# Patient Record
Sex: FEMALE | Race: ASIAN | NOT HISPANIC OR LATINO | URBAN - METROPOLITAN AREA
[De-identification: names, ages, dates, MRNs, and addresses within clinical notes are randomized per-mention and may not be internally consistent; named-entity substitution may affect disease eponyms.]

---

## 2024-10-28 ENCOUNTER — LAB OUTSIDE AN ENCOUNTER (OUTPATIENT)
Dept: URBAN - METROPOLITAN AREA CLINIC 33 | Facility: CLINIC | Age: 39
End: 2024-10-28

## 2024-10-28 ENCOUNTER — OFFICE VISIT (OUTPATIENT)
Dept: URBAN - METROPOLITAN AREA CLINIC 33 | Facility: CLINIC | Age: 39
End: 2024-10-28
Payer: COMMERCIAL

## 2024-10-28 ENCOUNTER — DASHBOARD ENCOUNTERS (OUTPATIENT)
Age: 39
End: 2024-10-28

## 2024-10-28 VITALS
WEIGHT: 173 LBS | HEART RATE: 72 BPM | DIASTOLIC BLOOD PRESSURE: 74 MMHG | SYSTOLIC BLOOD PRESSURE: 122 MMHG | HEIGHT: 64 IN | OXYGEN SATURATION: 97 % | BODY MASS INDEX: 29.53 KG/M2

## 2024-10-28 DIAGNOSIS — K76.0 FATTY LIVER: ICD-10-CM

## 2024-10-28 DIAGNOSIS — K21.9 GASTROESOPHAGEAL REFLUX DISEASE, UNSPECIFIED WHETHER ESOPHAGITIS PRESENT: ICD-10-CM

## 2024-10-28 DIAGNOSIS — R10.13 EPIGASTRIC PAIN: ICD-10-CM

## 2024-10-28 PROBLEM — 197321007: Status: ACTIVE | Noted: 2024-10-28

## 2024-10-28 PROBLEM — 79922009: Status: ACTIVE | Noted: 2024-10-28

## 2024-10-28 PROBLEM — 235595009: Status: ACTIVE | Noted: 2024-10-28

## 2024-10-28 PROCEDURE — 99204 OFFICE O/P NEW MOD 45 MIN: CPT | Performed by: INTERNAL MEDICINE

## 2024-10-28 RX ORDER — LEVOTHYROXINE SODIUM 50 UG/1
1 TABLET IN THE MORNING ON AN EMPTY STOMACH TABLET ORAL
Status: ACTIVE | COMMUNITY

## 2024-10-28 RX ORDER — SUCRALFATE 1 G/1
TAKE ONE TABLET BY MOUTH TWO TIMES A DAY ON AN EMPTY STOMACH TABLET ORAL
Refills: 0 | Status: ACTIVE | COMMUNITY

## 2024-10-28 RX ORDER — ESOMEPRAZOLE MAGNESIUM 40 MG/1
TAKE ONE CAPSULE BY MOUTH TWICE A DAY CAPSULE, DELAYED RELEASE ORAL
Qty: 60 UNSPECIFIED | Refills: 0 | Status: ACTIVE | COMMUNITY

## 2024-10-28 NOTE — HPI-FECAL URGENCY
Patient complains of fecal urgency. States that after meals she feels immediate urge to pass stool. Admits to 3-4 bowel movements a day with normal stools. Denies rectal bleeding, mucus, or melena. Denies associated weight loss.

## 2024-10-28 NOTE — HPI-ABDOMINAL PAIN
39 year old female patient presents for abdominal pain consult. Admits she has been experiencing symptoms since September 17, 2024. Describes symptoms as constant ache in epigastric region that radiates to RUQ. She admits symptoms are more present after meals. Denies nausea or vomiting. Denies EGD in the past.  Admits having recent imaging. States she went to [facility] on 09/26/2024 for symptoms. She had XR and US RUQ completed and labs.   US RUQ ABDOMEN (09/26/2024) IMPRESSION:  No evidence of cholelithiasis or cholecystitis. Fatty liver.  CHEST XR (09/26/2024) IMPRESSION: No acute radiographic abnormality.  OUTSIDE LABS (09/26/2024)  CBC - WNL  CMP - WNL  OUTSIDE LABS (08/12/2024) CBC w/ DIFF/PLT Eos, absolute - 0.6 H All other values WNL  CMP  Alk Phos - 133 H All other values WNL  Lipid Panel - WNL Vitamin B12 - WNL A1c - WNL  TSH - 7.010 H  Sed Rate - WNL Creatine Kinase - WNL  TIFFANIE by IFA - neg

## 2024-10-28 NOTE — HPI-BLOATING/GAS
39 year old patient complains of bloating. States symptoms have been present since September 17, 2024. Admits symptoms are more present after meals. Denies taking any OTC medications to relieve symptoms. Describes symptoms as fullness in epigastric region. States that upon applying pressure in region, she can induce belching. Admits having any previous breath test including H. Pylori or SIBO (2017).

## 2024-10-31 ENCOUNTER — OFFICE VISIT (OUTPATIENT)
Dept: URBAN - METROPOLITAN AREA CLINIC 23 | Facility: CLINIC | Age: 39
End: 2024-10-31

## 2024-11-14 ENCOUNTER — CLAIMS CREATED FROM THE CLAIM WINDOW (OUTPATIENT)
Dept: URBAN - METROPOLITAN AREA SURGERY CENTER 8 | Facility: SURGERY CENTER | Age: 39
End: 2024-11-14
Payer: COMMERCIAL

## 2024-11-14 ENCOUNTER — CLAIMS CREATED FROM THE CLAIM WINDOW (OUTPATIENT)
Dept: URBAN - METROPOLITAN AREA CLINIC 4 | Facility: CLINIC | Age: 39
End: 2024-11-14
Payer: COMMERCIAL

## 2024-11-14 ENCOUNTER — TELEPHONE ENCOUNTER (OUTPATIENT)
Dept: URBAN - METROPOLITAN AREA CLINIC 33 | Facility: CLINIC | Age: 39
End: 2024-11-14

## 2024-11-14 DIAGNOSIS — K29.80 DUODENITIS WITHOUT BLEEDING: ICD-10-CM

## 2024-11-14 DIAGNOSIS — K29.60 OTHER GASTRITIS WITHOUT BLEEDING: ICD-10-CM

## 2024-11-14 DIAGNOSIS — K29.80 ACUTE DUODENITIS: ICD-10-CM

## 2024-11-14 DIAGNOSIS — K31.89 OTHER DISEASES OF STOMACH AND DUODENUM: ICD-10-CM

## 2024-11-14 DIAGNOSIS — R10.13 ABDOMINAL DISCOMFORT, EPIGASTRIC: ICD-10-CM

## 2024-11-14 PROCEDURE — 88305 TISSUE EXAM BY PATHOLOGIST: CPT | Performed by: PATHOLOGY

## 2024-11-14 PROCEDURE — 88342 IMHCHEM/IMCYTCHM 1ST ANTB: CPT | Performed by: PATHOLOGY

## 2024-11-14 PROCEDURE — 43239 EGD BIOPSY SINGLE/MULTIPLE: CPT | Performed by: INTERNAL MEDICINE

## 2024-11-14 PROCEDURE — 00731 ANES UPR GI NDSC PX NOS: CPT | Performed by: REGISTERED NURSE

## 2024-11-14 RX ORDER — SUCRALFATE 1 G/1
TAKE ONE TABLET BY MOUTH TWO TIMES A DAY ON AN EMPTY STOMACH TABLET ORAL
Refills: 0 | Status: ACTIVE | COMMUNITY

## 2024-11-14 RX ORDER — ESOMEPRAZOLE MAGNESIUM 40 MG/1
TAKE ONE CAPSULE BY MOUTH TWICE A DAY CAPSULE, DELAYED RELEASE ORAL
Qty: 60 UNSPECIFIED | Refills: 0 | Status: ACTIVE | COMMUNITY

## 2024-11-14 RX ORDER — LEVOTHYROXINE SODIUM 50 UG/1
1 TABLET IN THE MORNING ON AN EMPTY STOMACH TABLET ORAL
Status: ACTIVE | COMMUNITY

## 2024-11-25 ENCOUNTER — OFFICE VISIT (OUTPATIENT)
Dept: URBAN - METROPOLITAN AREA CLINIC 33 | Facility: CLINIC | Age: 39
End: 2024-11-25
Payer: COMMERCIAL

## 2024-11-25 VITALS
HEIGHT: 64 IN | HEART RATE: 71 BPM | WEIGHT: 171 LBS | DIASTOLIC BLOOD PRESSURE: 74 MMHG | SYSTOLIC BLOOD PRESSURE: 114 MMHG | OXYGEN SATURATION: 97 % | BODY MASS INDEX: 29.19 KG/M2

## 2024-11-25 DIAGNOSIS — K29.50 CHRONIC GASTRITIS WITHOUT BLEEDING, UNSPECIFIED GASTRITIS TYPE: ICD-10-CM

## 2024-11-25 DIAGNOSIS — K21.9 GASTROESOPHAGEAL REFLUX DISEASE, UNSPECIFIED WHETHER ESOPHAGITIS PRESENT: ICD-10-CM

## 2024-11-25 DIAGNOSIS — R14.0 ABDOMINAL BLOATING: ICD-10-CM

## 2024-11-25 DIAGNOSIS — K76.0 FATTY LIVER: ICD-10-CM

## 2024-11-25 DIAGNOSIS — K52.9 POSTPRANDIAL DIARRHEA: ICD-10-CM

## 2024-11-25 DIAGNOSIS — R10.13 EPIGASTRIC PAIN: ICD-10-CM

## 2024-11-25 PROBLEM — 14384003: Status: ACTIVE | Noted: 2024-11-25

## 2024-11-25 PROBLEM — 8493009: Status: ACTIVE | Noted: 2024-11-25

## 2024-11-25 PROCEDURE — 99213 OFFICE O/P EST LOW 20 MIN: CPT | Performed by: INTERNAL MEDICINE

## 2024-11-25 RX ORDER — LEVOTHYROXINE SODIUM 50 UG/1
1 TABLET IN THE MORNING ON AN EMPTY STOMACH TABLET ORAL
Status: ACTIVE | COMMUNITY

## 2024-11-25 RX ORDER — ESOMEPRAZOLE MAGNESIUM 40 MG/1
TAKE ONE CAPSULE BY MOUTH TWICE A DAY CAPSULE, DELAYED RELEASE ORAL
Qty: 60 UNSPECIFIED | Refills: 0 | Status: ON HOLD | COMMUNITY

## 2024-11-25 RX ORDER — SUCRALFATE 1 G/1
TAKE ONE TABLET BY MOUTH TWO TIMES A DAY ON AN EMPTY STOMACH TABLET ORAL
Refills: 0 | Status: ON HOLD | COMMUNITY

## 2024-11-25 NOTE — HPI-BLOATING/GAS
39 year old female patient presents for follow up. She admits improvement.   Of last visit (10/28/2024) 39 year old patient complains of bloating. States symptoms have been present since September 17, 2024. Admits symptoms are more present after meals. Denies taking any OTC medications to relieve symptoms. Describes symptoms as fullness in epigastric region. States that upon applying pressure in region, she can induce belching. Admits having any previous breath test including H. Pylori or SIBO (2017).

## 2024-11-25 NOTE — HPI-ABDOMINAL PAIN
39 year old female patient presents for follow up. She admits improvement. She does admit occasional pain while laying down to sleep, but states it is not as painful compared to last visit. She had EGD completed on 11/14/2024, with results noted below. Since the procedure, she denies dysphagia, globus, changes in appetite, and changes in bowel habits.  EGD REPORT IMPRESSION: Normal esophagus. Z-line regular. Erythematous mucosa in the antrum. Biopsied. Normal examined duodenum. Biopsied.  EGD PATH (A) Duodenum, Biopsy (Cold Forceps): PEPTIC DUODENITIS. No Evidence of Infectious Organisms or Sprue. Negative for Dysplasia or Malignancy. (B) Stomach, Antrum;Body, Biopsy (Cold Forceps): CHRONIC INACTIVE GASTRITIS WITH HISTOLOGICAL CHANGES SUGGESTIVE OF TREATED H. PYLORI GASTRITIS. See Comment. No Evidence of H. Pylori Organisms or Intestinal Metaplasia. Negative For Dysplasia or Malignancy. COMMENT: The biopsy shows band-like superficial and interstitial deeper lymphoplasmacytic infiltrate and variable presence of some lymphoid follicles, without acute inflammation. No Helicobacter organisms are identified on H & amp;E and special stains. The histologic findings may represent past H. pylori gastritis treated with antibiotics and/or proton-pump inhibitors. Non-invasive test (such as urea breath test, fecal antigen detection or serum antibody) might be helpful to completely exclude H. pylori infection if clinically indicated   Of last visit (10/28/2024) 39 year old female patient presents for abdominal pain consult. Admits she has been experiencing symptoms since September 17, 2024. Describes symptoms as constant ache in epigastric region that radiates to RUQ. She admits symptoms are more present after meals. Denies nausea or vomiting. Denies EGD in the past.  Admits having recent imaging. States she went to [facility] on 09/26/2024 for symptoms. She had XR and US RUQ completed and labs.   US RUQ ABDOMEN (09/26/2024) IMPRESSION:  No evidence of cholelithiasis or cholecystitis. Fatty liver.  CHEST XR (09/26/2024) IMPRESSION: No acute radiographic abnormality.  OUTSIDE LABS (09/26/2024)  CBC - WNL  CMP - WNL  OUTSIDE LABS (08/12/2024) CBC w/ DIFF/PLT Eos, absolute - 0.6 H All other values WNL  CMP  Alk Phos - 133 H All other values WNL  Lipid Panel - WNL Vitamin B12 - WNL A1c - WNL  TSH - 7.010 H  Sed Rate - WNL Creatine Kinase - WNL  TIFFANIE by IFA - neg

## 2024-11-25 NOTE — HPI-FECAL URGENCY
39 year old female patient presents for follow up. She admits symptoms are about the same. She admits 3-4 bowel movements a day with normal stools. Denies rectal bleeding, mucus, or melena. Denies associated weight loss.   Of last visit (10/28/2024) Patient complains of fecal urgency. States that after meals she feels immediate urge to pass stool. Admits to 3-4 bowel movements a day with normal stools. Denies rectal bleeding, mucus, or melena. Denies associated weight loss.

## 2025-03-21 ENCOUNTER — OFFICE VISIT (OUTPATIENT)
Dept: URBAN - METROPOLITAN AREA CLINIC 33 | Facility: CLINIC | Age: 40
End: 2025-03-21

## 2025-03-21 RX ORDER — SUCRALFATE 1 G/1
TAKE ONE TABLET BY MOUTH TWO TIMES A DAY ON AN EMPTY STOMACH TABLET ORAL
Refills: 0 | Status: ON HOLD | COMMUNITY

## 2025-03-21 RX ORDER — ESOMEPRAZOLE MAGNESIUM 40 MG/1
TAKE ONE CAPSULE BY MOUTH TWICE A DAY CAPSULE, DELAYED RELEASE ORAL
Qty: 60 UNSPECIFIED | Refills: 0 | Status: ON HOLD | COMMUNITY

## 2025-03-21 RX ORDER — LEVOTHYROXINE SODIUM 50 UG/1
1 TABLET IN THE MORNING ON AN EMPTY STOMACH TABLET ORAL
Status: ACTIVE | COMMUNITY

## 2025-03-21 NOTE — HPI-TODAY'S VISIT:
40 year old female patient of Dr. Eli's presents for consult regarding abdominal pain, diarrhea, and fatigue.    OUTSIDE LABS (02/19/2025) HBsAg - negative HCV - nonreactive  PCP LABS (11/15/2024) CMP Bilirubin: 0.4 wnl, Alk Phos: 135 H, AST: 14 wnl, ALT: 13 wnl, Creatinine: 0.97 wnl, Albumin: 4.3 wnl

## 2025-04-03 ENCOUNTER — OFFICE VISIT (OUTPATIENT)
Dept: URBAN - METROPOLITAN AREA CLINIC 33 | Facility: CLINIC | Age: 40
End: 2025-04-03
Payer: COMMERCIAL

## 2025-04-03 DIAGNOSIS — R10.13 EPIGASTRIC ABDOMINAL PAIN: ICD-10-CM

## 2025-04-03 DIAGNOSIS — K58.0 IRRITABLE BOWEL SYNDROME WITH DIARRHEA: ICD-10-CM

## 2025-04-03 PROBLEM — 116289008: Status: ACTIVE | Noted: 2025-04-03

## 2025-04-03 PROBLEM — 197125005: Status: ACTIVE | Noted: 2025-04-03

## 2025-04-03 PROBLEM — 79922009: Status: ACTIVE | Noted: 2025-04-03

## 2025-04-03 PROCEDURE — 99214 OFFICE O/P EST MOD 30 MIN: CPT | Performed by: HOSPITALIST

## 2025-04-03 RX ORDER — ESOMEPRAZOLE MAGNESIUM 40 MG/1
TAKE ONE CAPSULE BY MOUTH TWICE A DAY CAPSULE, DELAYED RELEASE ORAL ONCE A DAY
Qty: 30 | Refills: 1

## 2025-04-03 RX ORDER — SUCRALFATE 1 G/1
TAKE ONE TABLET BY MOUTH TWO TIMES A DAY ON AN EMPTY STOMACH TABLET ORAL
Refills: 0 | Status: ACTIVE | COMMUNITY

## 2025-04-03 RX ORDER — ESOMEPRAZOLE MAGNESIUM 40 MG/1
TAKE ONE CAPSULE BY MOUTH TWICE A DAY CAPSULE, DELAYED RELEASE ORAL
Qty: 60 UNSPECIFIED | Refills: 0 | Status: ON HOLD | COMMUNITY

## 2025-04-03 RX ORDER — LEVOTHYROXINE SODIUM 50 UG/1
1 TABLET IN THE MORNING ON AN EMPTY STOMACH TABLET ORAL
Status: ACTIVE | COMMUNITY

## 2025-04-03 RX ORDER — SUCRALFATE 1 G/1
TAKE ONE TABLET BY MOUTH TWO TIMES A DAY ON AN EMPTY STOMACH TABLET ORAL TWICE A DAY
Qty: 60 | Refills: 1

## 2025-04-03 NOTE — HPI-TODAY'S VISIT:
40 year old brodiele patient present today for an consultation for abdominal pain. Patient was last seen by Dr. Eli on 11.25.2024 for the same symptoms.  Interval history -patient was seen by Dr. Eli in the past for symptoms of epigastric pain, abdominal bloating as well as GERD.  EGD done in November 2024 did not show any major findings.  Biopsies showed previously treated H. pylori gastritis but patient denies any history of H. pylori infection and previous treatment in the past. -Patient reports she took esomeprazole as well as sucralfate before EGD which almost resolved her symptoms at that time and she has stopped it for the last several months. -In the last 1 months she has seen recurrence of symptoms especially epigastric pain along with right upper quadrant abdominal pain which radiates all to the abdomen, intermittent heartburn symptoms along with significant abdominal bloating.  Most of her upper GI symptoms related to fatty and greasy food.  She had ultrasound abdomen done in 2024 which did not show any evidence of cholelithiasis. -She also reports lower abdominal cramping followed by 3-4 loose watery stools.

## 2025-04-16 LAB — H PYLORI BREATH TEST: NOT DETECTED

## 2025-04-17 ENCOUNTER — TELEPHONE ENCOUNTER (OUTPATIENT)
Dept: URBAN - METROPOLITAN AREA CLINIC 35 | Facility: CLINIC | Age: 40
End: 2025-04-17

## 2025-04-24 ENCOUNTER — LAB OUTSIDE AN ENCOUNTER (OUTPATIENT)
Dept: URBAN - METROPOLITAN AREA CLINIC 33 | Facility: CLINIC | Age: 40
End: 2025-04-24

## 2025-04-24 ENCOUNTER — OFFICE VISIT (OUTPATIENT)
Dept: URBAN - METROPOLITAN AREA CLINIC 33 | Facility: CLINIC | Age: 40
End: 2025-04-24
Payer: COMMERCIAL

## 2025-04-24 DIAGNOSIS — K58.0 IRRITABLE BOWEL SYNDROME WITH DIARRHEA: ICD-10-CM

## 2025-04-24 DIAGNOSIS — K76.0 FATTY LIVER DISEASE, NONALCOHOLIC: ICD-10-CM

## 2025-04-24 DIAGNOSIS — R10.9 ABDOMINAL WALL PAIN: ICD-10-CM

## 2025-04-24 DIAGNOSIS — R14.0 ABDOMINAL BLOATING: ICD-10-CM

## 2025-04-24 DIAGNOSIS — R10.13 EPIGASTRIC ABDOMINAL PAIN: ICD-10-CM

## 2025-04-24 PROBLEM — 162042000: Status: ACTIVE | Noted: 2025-04-24

## 2025-04-24 PROBLEM — 1231824009: Status: ACTIVE | Noted: 2025-04-24

## 2025-04-24 PROCEDURE — 99214 OFFICE O/P EST MOD 30 MIN: CPT | Performed by: HOSPITALIST

## 2025-04-24 RX ORDER — FAMOTIDINE 40 MG/1
1 TABLET TABLET, FILM COATED ORAL
Qty: 60 | Refills: 0 | OUTPATIENT
Start: 2025-04-24

## 2025-04-24 RX ORDER — ESOMEPRAZOLE MAGNESIUM 40 MG/1
TAKE ONE CAPSULE BY MOUTH TWICE A DAY CAPSULE, DELAYED RELEASE ORAL ONCE A DAY
Qty: 30 | Refills: 1 | Status: ACTIVE | COMMUNITY

## 2025-04-24 RX ORDER — LEVOTHYROXINE SODIUM 50 UG/1
1 TABLET IN THE MORNING ON AN EMPTY STOMACH TABLET ORAL
Status: ACTIVE | COMMUNITY

## 2025-04-24 RX ORDER — SUCRALFATE 1 G/1
TAKE ONE TABLET BY MOUTH TWO TIMES A DAY ON AN EMPTY STOMACH TABLET ORAL TWICE A DAY
Qty: 60 | Refills: 1 | Status: ON HOLD | COMMUNITY

## 2025-04-24 RX ORDER — ESOMEPRAZOLE MAGNESIUM 40 MG/1
TAKE ONE CAPSULE BY MOUTH TWICE A DAY CAPSULE, DELAYED RELEASE ORAL
Qty: 30 | Refills: 0

## 2025-04-24 NOTE — HPI-TODAY'S VISIT:
Patient present today for a 4 week follow-up for IBS-diarrhea, epigastric pain, and bloating.   Interval history - Patient reports 90% improvement in her symptoms after taking his omeprazole 40 mg daily for the past 3 weeks. - She still continues to report soreness in her right upper quadrant which is reproducible even on superficial palpation of the abdominal wall.  She has persistent sneezing and coughing secondary to her allergies which could have caused abdominal wall strain.  (Last Visit 04.03.2025) 40 year old nav patient present today for an consultation for abdominal pain. Patient was last seen by Dr. Eli on 11.25.2024 for the same symptoms.  Interval history -patient was seen by Dr. Eli in the past for symptoms of epigastric pain, abdominal bloating as well as GERD.  EGD done in November 2024 did not show any major findings.  Biopsies showed previously treated H. pylori gastritis but patient denies any history of H. pylori infection and previous treatment in the past. -Patient reports she took esomeprazole as well as sucralfate before EGD which almost resolved her symptoms at that time and she has stopped it for the last several months. -In the last 1 months she has seen recurrence of symptoms especially epigastric pain along with right upper quadrant abdominal pain which radiates all to the abdomen, intermittent heartburn symptoms along with significant abdominal bloating.  Most of her upper GI symptoms related to fatty and greasy food.  She had ultrasound abdomen done in 2024 which did not show any evidence of cholelithiasis. -She also reports lower abdominal cramping followed by 3-4 loose watery stools.

## 2025-05-02 ENCOUNTER — TELEPHONE ENCOUNTER (OUTPATIENT)
Dept: URBAN - METROPOLITAN AREA CLINIC 35 | Facility: CLINIC | Age: 40
End: 2025-05-02
